# Patient Record
Sex: MALE | Race: WHITE | NOT HISPANIC OR LATINO
[De-identification: names, ages, dates, MRNs, and addresses within clinical notes are randomized per-mention and may not be internally consistent; named-entity substitution may affect disease eponyms.]

---

## 2017-03-12 ENCOUNTER — TRANSCRIPTION ENCOUNTER (OUTPATIENT)
Age: 22
End: 2017-03-12

## 2020-11-06 ENCOUNTER — TRANSCRIPTION ENCOUNTER (OUTPATIENT)
Age: 25
End: 2020-11-06

## 2020-12-30 ENCOUNTER — TRANSCRIPTION ENCOUNTER (OUTPATIENT)
Age: 25
End: 2020-12-30

## 2020-12-31 ENCOUNTER — TRANSCRIPTION ENCOUNTER (OUTPATIENT)
Age: 25
End: 2020-12-31

## 2021-11-30 ENCOUNTER — TRANSCRIPTION ENCOUNTER (OUTPATIENT)
Age: 26
End: 2021-11-30

## 2021-12-13 ENCOUNTER — TRANSCRIPTION ENCOUNTER (OUTPATIENT)
Age: 26
End: 2021-12-13

## 2022-01-18 ENCOUNTER — TRANSCRIPTION ENCOUNTER (OUTPATIENT)
Age: 27
End: 2022-01-18

## 2022-01-21 PROBLEM — Z00.00 ENCOUNTER FOR PREVENTIVE HEALTH EXAMINATION: Status: ACTIVE | Noted: 2022-01-21

## 2022-01-27 ENCOUNTER — APPOINTMENT (OUTPATIENT)
Dept: PULMONOLOGY | Facility: CLINIC | Age: 27
End: 2022-01-27
Payer: COMMERCIAL

## 2022-01-27 VITALS
SYSTOLIC BLOOD PRESSURE: 124 MMHG | OXYGEN SATURATION: 96 % | TEMPERATURE: 98.3 F | HEIGHT: 73 IN | DIASTOLIC BLOOD PRESSURE: 82 MMHG | RESPIRATION RATE: 16 BRPM | HEART RATE: 68 BPM | WEIGHT: 215 LBS | BODY MASS INDEX: 28.49 KG/M2

## 2022-01-27 DIAGNOSIS — Z20.822 CONTACT WITH AND (SUSPECTED) EXPOSURE TO COVID-19: ICD-10-CM

## 2022-01-27 DIAGNOSIS — Z78.9 OTHER SPECIFIED HEALTH STATUS: ICD-10-CM

## 2022-01-27 DIAGNOSIS — Z82.49 FAMILY HISTORY OF ISCHEMIC HEART DISEASE AND OTHER DISEASES OF THE CIRCULATORY SYSTEM: ICD-10-CM

## 2022-01-27 DIAGNOSIS — Z80.3 FAMILY HISTORY OF MALIGNANT NEOPLASM OF BREAST: ICD-10-CM

## 2022-01-27 PROCEDURE — 99204 OFFICE O/P NEW MOD 45 MIN: CPT

## 2022-01-28 NOTE — HISTORY OF PRESENT ILLNESS
[TextBox_4] : 26 year-old man\par \par He was diagnosed with influenza November 30th - he was treated with Tamiflu. He has fatigue, cough, runny nose. \par He subsequently was diagnosed with COVID December 13, 2021 - he had chest tightness, fatigue, no fever. He has loss of sense of tast and smell.\par He developed chest tightness approximately 3 weeks prior. He had no fever, wheezing, dyspnea on exertion. \par He was seen in  1/16/2022, he was found to have patchy infiltrates in the RLL, managed with Z-apck. \par He has monitored oxygen around 99%. \par \par \par He smoked marijuana since college, now smokes sporadically, has not smoked in the past few months. \par No cigarette. \par No previous lung disorders. \par \par

## 2022-01-28 NOTE — ASSESSMENT
[FreeTextEntry1] : Assessment:\par 26 year-old man, presents with atypical chest pain, this likely represents costochondritis however will obtain addition workup to rule out underlying cardiopulmonary pathology.\par \par Recommendations:\par --Give prednisone 20mg x 3 days for possible chest pain related to asthma/bronchtis.\par --C/w albuterol inhaler\par --Refer to cardiology for echo, has family history of valvular disorder\par --Obtain PFT\par --Will have him follow up in 2 week.

## 2022-02-02 ENCOUNTER — APPOINTMENT (OUTPATIENT)
Dept: INTERNAL MEDICINE | Facility: CLINIC | Age: 27
End: 2022-02-02

## 2022-02-04 ENCOUNTER — APPOINTMENT (OUTPATIENT)
Dept: INTERNAL MEDICINE | Facility: CLINIC | Age: 27
End: 2022-02-04
Payer: COMMERCIAL

## 2022-02-04 VITALS
DIASTOLIC BLOOD PRESSURE: 74 MMHG | WEIGHT: 221 LBS | OXYGEN SATURATION: 97 % | BODY MASS INDEX: 29.93 KG/M2 | TEMPERATURE: 98.3 F | SYSTOLIC BLOOD PRESSURE: 130 MMHG | HEIGHT: 72 IN | HEART RATE: 92 BPM | RESPIRATION RATE: 16 BRPM

## 2022-02-04 PROCEDURE — 94010 BREATHING CAPACITY TEST: CPT

## 2022-02-04 PROCEDURE — 94727 GAS DIL/WSHOT DETER LNG VOL: CPT

## 2022-02-04 PROCEDURE — 94729 DIFFUSING CAPACITY: CPT

## 2022-02-10 ENCOUNTER — APPOINTMENT (OUTPATIENT)
Dept: PULMONOLOGY | Facility: CLINIC | Age: 27
End: 2022-02-10
Payer: COMMERCIAL

## 2022-02-10 PROCEDURE — 99443: CPT

## 2022-02-10 NOTE — HISTORY OF PRESENT ILLNESS
[Other Location: e.g. School (Enter Location, City,State)___] : at [unfilled], at the time of the visit. [Medical Office: ([facility])___] : at the medical office located in  [Verbal consent obtained from patient] : the patient, [unfilled] [TextBox_4] : 26 year-old man, telephonic visit for follow up of chest dyscomfort. \par \par He was last seen 1/27/22 after visit to . He was found to have patchy infiltrates in the RLL, managed with Z-apck. At his last visit he was given Prednsione, albuterol. He was also referred to cardiology.\par \par Since his last visit he is feeling better. \par He gets subtle releif from albuterol. \par Chest discomfort improved. \par He has no wheezing, fevers, sputum production, or cough. No significant dyspnea but occasional episodes. \par He has seen cardiology, ECG normal, is scheduled for echocardiogram. \par He does have anxiousness. \par \par He smoked marijuana since college, now smokes sporadically, has not smoked in the past few months. \par No cigarette. \par No previous lung disorders. \par \par

## 2022-02-10 NOTE — ASSESSMENT
[FreeTextEntry1] : Assessment:\par 26 year-old man, presents with atypical chest pain, this likely represents costochondritis.\par --PFT were normal - showed no obstructive defect. There is a possibility his represents  mild-intermittent asthma/reactive airways - but this does appear less likely. \par \par \par Recommendations:\par --He can c/w albuterol inhaler as needed and will update me regarding usage at the next visit. \par --Obtain echocardiogram as instructed by cardiology\par --Will order CXR to follow up infiltrate noted by radiology - however to my eye CXR appears grossly normal. \par --Will have him follow up in 2 week

## 2022-02-18 ENCOUNTER — APPOINTMENT (OUTPATIENT)
Dept: CARDIOLOGY | Facility: CLINIC | Age: 27
End: 2022-02-18

## 2022-02-21 ENCOUNTER — APPOINTMENT (OUTPATIENT)
Dept: PULMONOLOGY | Facility: CLINIC | Age: 27
End: 2022-02-21
Payer: COMMERCIAL

## 2022-02-21 ENCOUNTER — APPOINTMENT (OUTPATIENT)
Dept: RADIOLOGY | Facility: CLINIC | Age: 27
End: 2022-02-21
Payer: COMMERCIAL

## 2022-02-21 ENCOUNTER — OUTPATIENT (OUTPATIENT)
Dept: OUTPATIENT SERVICES | Facility: HOSPITAL | Age: 27
LOS: 1 days | End: 2022-02-21
Payer: COMMERCIAL

## 2022-02-21 VITALS
WEIGHT: 221 LBS | BODY MASS INDEX: 29.93 KG/M2 | HEART RATE: 97 BPM | SYSTOLIC BLOOD PRESSURE: 110 MMHG | OXYGEN SATURATION: 90 % | DIASTOLIC BLOOD PRESSURE: 60 MMHG | RESPIRATION RATE: 16 BRPM | HEIGHT: 72 IN | TEMPERATURE: 97.7 F

## 2022-02-21 DIAGNOSIS — R07.9 CHEST PAIN, UNSPECIFIED: ICD-10-CM

## 2022-02-21 PROCEDURE — 71046 X-RAY EXAM CHEST 2 VIEWS: CPT

## 2022-02-21 PROCEDURE — 71046 X-RAY EXAM CHEST 2 VIEWS: CPT | Mod: 26

## 2022-02-21 PROCEDURE — 99213 OFFICE O/P EST LOW 20 MIN: CPT

## 2022-02-21 RX ORDER — PREDNISONE 20 MG/1
20 TABLET ORAL DAILY
Qty: 6 | Refills: 0 | Status: DISCONTINUED | COMMUNITY
Start: 2022-01-27 | End: 2022-02-21

## 2022-02-21 NOTE — HISTORY OF PRESENT ILLNESS
[TextBox_4] : 26 year-old man,presents for follow up of chest dyscomfort.\par \par He was initially seen on 1/27/22 after visit to . He was found to have patchy infiltrates in the RLL, managed with Z-apck. At his last visit he was given Prednsione, albuterol. \par \par Since his last visit he is feeling better. \par He still has subtle chest discomfort, 2/10. It is not associated with exertion. Is not significantly relieved by albuterol. \par He has no wheezing, fevers, sputum production, or cough. \par No dyspnea on exertion. \par He has seen cardiology, ECG normal, is scheduled for echocardiogram tomorrow. . \par \par He smoked marijuana since college, now smokes sporadically, has not smoked in the past few months. \par No cigarette. \par No previous lung disorders. \par \par

## 2022-02-21 NOTE — ASSESSMENT
[FreeTextEntry1] : Assessment:\par 26 year-old man, presents with atypical chest pain, this likely represents costochondritis, less likely reactive airway disease/asthma. \par --Labwork normal IgE, asthma profile, except low dust+\par --PFT were normal - showed no obstructive defect. \par --CXR done today - showed improvement in right lower lobe infiltrate. \par \par \par Recommendations:\par --He can c/w albuterol inhaler as needed. \par --Obtain echocardiogram as instructed by cardiology - will notify our office if this is abnormal. \par --Continue to follow with PMD\par --Can follow up as needed, will notify us if any issues arise.

## 2022-02-21 NOTE — PHYSICAL EXAM
[Normal Oropharynx] : normal oropharynx [No Acute Distress] : no acute distress [Normal Appearance] : normal appearance [Normal Rate/Rhythm] : normal rate/rhythm [Normal S1, S2] : normal s1, s2 [No Murmurs] : no murmurs [No Resp Distress] : no resp distress [Clear to Auscultation Bilaterally] : clear to auscultation bilaterally [No Abnormalities] : no abnormalities [Normal Color/ Pigmentation] : normal color/ pigmentation [No Focal Deficits] : no focal deficits [Normal Affect] : normal affect [Oriented x3] : oriented x3 [TextBox_2] : Deferred telephonic visit.

## 2022-03-01 ENCOUNTER — EMERGENCY (EMERGENCY)
Facility: HOSPITAL | Age: 27
LOS: 1 days | Discharge: HOME | End: 2022-03-01
Attending: EMERGENCY MEDICINE | Admitting: EMERGENCY MEDICINE
Payer: COMMERCIAL

## 2022-03-01 VITALS
HEIGHT: 73 IN | HEART RATE: 73 BPM | DIASTOLIC BLOOD PRESSURE: 81 MMHG | WEIGHT: 225.31 LBS | SYSTOLIC BLOOD PRESSURE: 133 MMHG | RESPIRATION RATE: 18 BRPM | TEMPERATURE: 98 F | OXYGEN SATURATION: 97 %

## 2022-03-01 PROCEDURE — 99283 EMERGENCY DEPT VISIT LOW MDM: CPT

## 2022-03-01 RX ORDER — CIPROFLOXACIN HCL 0.3 %
2 DROPS OPHTHALMIC (EYE) ONCE
Refills: 0 | Status: COMPLETED | OUTPATIENT
Start: 2022-03-01 | End: 2022-03-01

## 2022-03-01 RX ADMIN — Medication 2 DROP(S): at 02:15

## 2022-03-01 RX ADMIN — Medication 1 DROP(S): at 02:00

## 2022-03-01 NOTE — ED PROVIDER NOTE - EYES, MLM
R eye normal. PERRL. L eye slight focal erythema of sclera medial side. no FB, lids everted for exam. no chemosis. no dc. ant chamber clear.  fluorescein exam: mild uptake medial sclera

## 2022-03-01 NOTE — ED PROVIDER NOTE - CARE PROVIDER_API CALL
[facility]  OPHTHALM[facility]Y  [address] [place] [id number]  Phone: [phone]  Fax: [phone]  Follow Up Time: 1-3 Days

## 2022-03-01 NOTE — ED PROVIDER NOTE - ENMT NEGATIVE STATEMENT, MLM
EOMI; PERRL; no drainage or redness
Ears: no ear pain and no hearing problems. Nose: no nasal congestion and no nasal drainage. Mouth/Throat: no dysphagia, no hoarseness and no throat pain. Neck: no lumps, no pain, no stiffness and no swollen glands.

## 2022-03-01 NOTE — ED PROVIDER NOTE - PATIENT PORTAL LINK FT
You can access the FollowMyHealth Patient Portal offered by [facility] by registering at the following website: [email or web address]. By joining [employer]’s FollowMyHealth portal, you will also be able to view your health information using other applications (apps) compatible with our system.

## 2022-03-01 NOTE — ED PROVIDER NOTE - OBJECTIVE STATEMENT
pt c/o irritation, moderate, to L eye, FB sensation started about 1 hr ago when trying to sleep. felt like something got in eye, tried to rub eye, then rinse with water and eye drops.  no blurry vision. no pus. no fever. does not wear contacts/glasses.

## 2022-03-01 NOTE — ED PROVIDER NOTE - NSFOLLOWUPINSTRUCTIONS_ED_ALL_ED_FT
- see eye doctor for follow up in next 1-2 days  - use ciprofloxacin eye drops, 2 drops to left eye 4x/day for 1 week  - return for worse pain, swelling, blurry vision or other concerns        Corneal Abrasion     WHAT YOU NEED TO KNOW:    A corneal abrasion is a scratch on the cornea of your eye. The cornea is the clear layer that covers the front of your eye. A small scratch may heal in 1 to 2 days. Deeper or larger scratches may take longer to heal.     Eye Anatomy         DISCHARGE INSTRUCTIONS:    Call your doctor or ophthalmologist if:   •Your eye pain or vision gets worse.      •You have yellow or green drainage from your eye.      •You have questions or concerns about your condition or care.      Medicines:   •Medicines may be given in the form of eyedrops or ointment to help prevent an eye infection. You may also be given eyedrops to decrease pain.      •Take your medicine as directed. Contact your healthcare provider if you think your medicine is not helping or if you have side effects. Tell him or her if you are allergic to any medicine. Keep a list of the medicines, vitamins, and herbs you take. Include the amounts, and when and why you take them. Bring the list or the pill bottles to follow-up visits. Carry your medicine list with you in case of an emergency.      Care for your eyes:   •Get regular eye exams. Get your eyes checked at least every year.      •Eat healthy foods. Fresh fruits and vegetables that are rich in vitamins A and C may help with your vision. Foods such as sweet potatoes, apricots, and carrots are rich in nutrients for the eyes.  Sources of Vitamin A       Sources of Vitamin C           •Take care of your contacts or glasses. Store, clean, and use your contacts or glasses as directed. Replace your glasses or contact lenses as often as your healthcare provider suggests.      •Decrease eye strain. Rest your eyes, especially after you read or use a computer for long periods of time. Get plenty of sleep at night. Use lights that reduce glare in your home, school, or workplace.      •Wear dark sunglasses. This will help prevent pain and light sensitivity. Make sure the sunglasses have UVA and UVB protection. This will protect your eyes when you go outside.      •Use eyedrops safely. If your treatment plan includes eyedrops, it is important to use them as directed. Your provider may give you detailed instructions to follow. The eyedrops may also come with safety instructions. Follow all instructions to help prevent an infection. Do not touch the tip of the bottle to your eye. Germs from your eye can spread to the medicine bottle.  Steps 1 2 3 4           Help prevent corneal abrasions:   •Remove your contact lenses if your eyes feel dry or irritated.      •Wash your hands if you need to touch your eyes or your face.  Handwashing           •Trim your fingernails so you cannot scratch your eye.      •Wear protective eyewear when you work with chemicals, wood, dust, or metal.      •Wear protective eyewear when you play sports.      •Do not wear your contacts for longer than you should.      •Do not sleep with your contacts in.      •Do not wear glitter makeup. Glitter can easily get into your eyes and under contact lenses.      Follow up with your doctor or ophthalmologist as directed: Write down your questions so you remember to ask them during your visits.

## 2022-03-01 NOTE — ED PROVIDER NOTE - CLINICAL SUMMARY MEDICAL DECISION MAKING FREE TEXT BOX
pt c L eye irritation, FB sensation. no visual changes. mild erythema of medial sclera with +fluoroscein uptake. likely conjunctival abrasion. no FB. cipro otic drops. f/u ophthal

## 2022-03-01 NOTE — ED PROVIDER NOTE - CARE PLAN
Principal Discharge DX:	Corneal abrasion   1 Principal Discharge DX:	Conjunctival abrasion, left, initial encounter
